# Patient Record
Sex: MALE | Race: WHITE
[De-identification: names, ages, dates, MRNs, and addresses within clinical notes are randomized per-mention and may not be internally consistent; named-entity substitution may affect disease eponyms.]

---

## 2021-08-25 ENCOUNTER — RX ONLY (RX ONLY)
Age: 26
End: 2021-08-25

## 2021-08-25 ENCOUNTER — OPTICAL OFFICE (OUTPATIENT)
Dept: URBAN - NONMETROPOLITAN AREA CLINIC 5 | Facility: CLINIC | Age: 26
Setting detail: OPHTHALMOLOGY
End: 2021-08-25
Payer: COMMERCIAL

## 2021-08-25 ENCOUNTER — DOCTOR'S OFFICE (OUTPATIENT)
Dept: URBAN - NONMETROPOLITAN AREA CLINIC 2 | Facility: CLINIC | Age: 26
Setting detail: OPHTHALMOLOGY
End: 2021-08-25
Payer: COMMERCIAL

## 2021-08-25 DIAGNOSIS — H52.13: ICD-10-CM

## 2021-08-25 DIAGNOSIS — H52.223: ICD-10-CM

## 2021-08-25 PROCEDURE — V2020 VISION SVCS FRAMES PURCHASES: HCPCS | Performed by: OPTOMETRIST

## 2021-08-25 PROCEDURE — V2103 SPHEROCYLINDR 4.00D/12-2.00D: HCPCS | Performed by: OPTOMETRIST

## 2021-08-25 PROCEDURE — 92004 COMPRE OPH EXAM NEW PT 1/>: CPT | Performed by: OPTOMETRIST

## 2021-08-25 PROCEDURE — V2750 ANTI-REFLECTIVE COATING: HCPCS | Performed by: OPTOMETRIST

## 2021-08-25 ASSESSMENT — REFRACTION_MANIFEST
OD_CYLINDER: -0.75
OU_VA: 20/20
OS_CYLINDER: -0.75
OD_SPHERE: -0.50
OD_VA1: 20/20
OS_AXIS: 035
OS_SPHERE: -0.50
OD_AXIS: 145
OS_VA2: 20/20
OS_VA1: 20/20
OD_VA2: 20/20

## 2021-08-25 ASSESSMENT — CONFRONTATIONAL VISUAL FIELD TEST (CVF)
OD_FINDINGS: FULL
OS_FINDINGS: FULL

## 2021-08-25 ASSESSMENT — AXIALLENGTH_DERIVED
OS_AL: 23.3469
OD_AL: 23.2047
OS_AL: 23.2047
OD_AL: 23.2519

## 2021-08-25 ASSESSMENT — REFRACTION_CURRENTRX
OS_OVR_VA: 20/
OD_OVR_VA: 20/

## 2021-08-25 ASSESSMENT — SPHEQUIV_DERIVED
OS_SPHEQUIV: -0.875
OS_SPHEQUIV: -1.25
OD_SPHEQUIV: -1
OD_SPHEQUIV: -0.875

## 2021-08-25 ASSESSMENT — KERATOMETRY
OD_K1POWER_DIOPTERS: 45.00
OS_K1POWER_DIOPTERS: 44.75
OS_K2POWER_DIOPTERS: 46.25
OS_AXISANGLE_DEGREES: 018
OD_K2POWER_DIOPTERS: 46.00
OD_AXISANGLE_DEGREES: 073

## 2021-08-25 ASSESSMENT — VISUAL ACUITY
OS_BCVA: 20/40-2
OD_BCVA: 20/40

## 2021-08-25 ASSESSMENT — REFRACTION_AUTOREFRACTION
OD_AXIS: 142
OS_AXIS: 035
OD_SPHERE: -0.50
OS_CYLINDER: -1.00
OS_SPHERE: -0.75
OD_CYLINDER: -1.00

## 2021-08-25 ASSESSMENT — TONOMETRY
OD_IOP_MMHG: 14
OS_IOP_MMHG: 14

## 2021-12-08 ENCOUNTER — OFFICE VISIT (OUTPATIENT)
Dept: FAMILY MEDICINE CLINIC | Facility: CLINIC | Age: 26
End: 2021-12-08
Payer: COMMERCIAL

## 2021-12-08 VITALS
RESPIRATION RATE: 18 BRPM | WEIGHT: 158.8 LBS | HEIGHT: 65 IN | OXYGEN SATURATION: 98 % | TEMPERATURE: 98 F | BODY MASS INDEX: 26.46 KG/M2 | DIASTOLIC BLOOD PRESSURE: 70 MMHG | HEART RATE: 76 BPM | SYSTOLIC BLOOD PRESSURE: 118 MMHG

## 2021-12-08 DIAGNOSIS — Z11.59 NEED FOR HEPATITIS C SCREENING TEST: ICD-10-CM

## 2021-12-08 DIAGNOSIS — Z11.4 SCREENING FOR HIV (HUMAN IMMUNODEFICIENCY VIRUS): ICD-10-CM

## 2021-12-08 DIAGNOSIS — Z00.00 ANNUAL PHYSICAL EXAM: Primary | ICD-10-CM

## 2021-12-08 DIAGNOSIS — Z13.220 SCREENING, LIPID: ICD-10-CM

## 2021-12-08 PROCEDURE — 3008F BODY MASS INDEX DOCD: CPT | Performed by: FAMILY MEDICINE

## 2021-12-08 PROCEDURE — 1036F TOBACCO NON-USER: CPT | Performed by: FAMILY MEDICINE

## 2021-12-08 PROCEDURE — 3725F SCREEN DEPRESSION PERFORMED: CPT | Performed by: FAMILY MEDICINE

## 2021-12-08 PROCEDURE — 99385 PREV VISIT NEW AGE 18-39: CPT | Performed by: FAMILY MEDICINE

## 2022-12-09 ENCOUNTER — TELEPHONE (OUTPATIENT)
Dept: FAMILY MEDICINE CLINIC | Facility: CLINIC | Age: 27
End: 2022-12-09

## 2022-12-09 NOTE — LETTER
December 9, 2022     Iva Mccallum  [address] [address] [place] [address]      Dear Mr Pierce Meth: We are sorry that you missed your appointment with Dr Barbara Edmondson on 12/9/2022  Your health and follow-up medical care are important to us  Please call our office as soon as possible so that we may reschedule your appointment  If you have already rescheduled your appointment, please disregard this letter           Sincerely,        Dangelo Burris DO        CC: No Recipients

## 2024-03-22 ENCOUNTER — TELEPHONE (OUTPATIENT)
Age: 29
End: 2024-03-22

## 2024-09-12 ENCOUNTER — TELEPHONE (OUTPATIENT)
Dept: FAMILY MEDICINE CLINIC | Facility: CLINIC | Age: 29
End: 2024-09-12

## 2024-09-12 NOTE — TELEPHONE ENCOUNTER
Patient attribution    Last seen 12/2021    Left message for patient to call our office to schedule an appointment

## 2024-09-27 ENCOUNTER — TELEPHONE (OUTPATIENT)
Dept: FAMILY MEDICINE CLINIC | Facility: CLINIC | Age: 29
End: 2024-09-27

## 2024-11-25 ENCOUNTER — TELEPHONE (OUTPATIENT)
Dept: FAMILY MEDICINE CLINIC | Facility: CLINIC | Age: 29
End: 2024-11-25

## 2024-11-25 NOTE — LETTER
[address] [address]  Phone#  [phone]  Fax#  [phone]      November 25, 2024      Our office has attempted to contact you several times regarding your current primary care provider. You have seen Dr Curtis in the past. She is no longer with our office. If you are being seen with a new primary care provider please call our office so we are able to update your file. If you are still our patient please contact the office at [phone] to set up a current appointment.     Thank you,     [facility]